# Patient Record
Sex: FEMALE | Race: BLACK OR AFRICAN AMERICAN
[De-identification: names, ages, dates, MRNs, and addresses within clinical notes are randomized per-mention and may not be internally consistent; named-entity substitution may affect disease eponyms.]

---

## 2018-08-25 ENCOUNTER — HOSPITAL ENCOUNTER (EMERGENCY)
Dept: HOSPITAL 72 - EMR | Age: 23
Discharge: HOME | End: 2018-08-25
Payer: COMMERCIAL

## 2018-08-25 VITALS — WEIGHT: 115 LBS | HEIGHT: 65 IN | BODY MASS INDEX: 19.16 KG/M2

## 2018-08-25 DIAGNOSIS — T38.3X1A: Primary | ICD-10-CM
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
## 2018-08-25 DIAGNOSIS — Z88.2: ICD-10-CM

## 2018-08-25 DIAGNOSIS — H10.213: ICD-10-CM
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
## 2018-08-25 PROCEDURE — 99282 EMERGENCY DEPT VISIT SF MDM: CPT

## 2018-08-26 VITALS — SYSTOLIC BLOOD PRESSURE: 114 MMHG | DIASTOLIC BLOOD PRESSURE: 82 MMHG

## 2019-03-05 NOTE — EMERGENCY ROOM REPORT
History of Present Illness


General


Chief Complaint:  Eye Problems


Source:  Patient





Present Illness


HPI


Is a 22-year-old female with no past medical history.  She presents with chief 

complaint of burning to the eyes.  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  

She washed it out.  Couple hours later start some burning sensation.  Accu-Chek 

there was normal.  She came in to be evaluated.  No blurry vision no other 

complaint.  Nothing made it better.  Nothing made it worse.


Allergies:  


Coded Allergies:  


     SULFA (SULFONAMIDE ANTIBIOTICS) (Verified  Allergy, Unknown, 8/25/18)





Patient History


Past Medical History:  see triage record, old chart reviewed


Past Surgical History:  none


Pertinent Family History:  none


Social History:  Denies: smoking


Last Menstrual Period:  MAY 2018


Pregnant Now:  No


Immunizations:  other


Reviewed Nursing Documentation:  PMH: Agreed; PSxH: Agreed





Nursing Documentation-PMH


Past Medical History:  No Stated History





Review of Systems


Eye:  Reports: eye pain; Denies: blurred vision


ENT:  Denies: ear pain, nose congestion, throat swelling


Respiratory:  Denies: cough, shortness of breath


Cardiovascular:  Denies: chest pain, palpitations


Gastrointestinal:  Denies: abdominal pain, diarrhea, nausea, vomiting


Musculoskeletal:  Denies: back pain, joint pain


Skin:  Denies: rash


Neurological:  Denies: headache, numbness


Endocrine:  Denies: increased thirst, increased urine


Hematologic/Lymphatic:  Denies: easy bruising


All Other Systems:  negative except mentioned in HPI





Physical Exam





Vital Signs








  Date Time  Temp Pulse Resp B/P (MAP) Pulse Ox O2 Delivery O2 Flow Rate FiO2


 


8/25/18 22:19 98.3 69 18 114/82 99 Room Air  





 98.2       





vitals normal


Sp02 EP Interpretation:  reviewed, normal


General Appearance:  well appearing, no apparent distress, alert


Head:  normocephalic, atraumatic


Eyes:  bilateral eye PERRL, bilateral eye EOMI


ENT:  hearing grossly normal, normal pharynx


Neck:  full range of motion, supple, no meningismus


Respiratory:  chest non-tender, lungs clear, normal breath sounds


Cardiovascular #1:  regular rate, rhythm, no murmur


Gastrointestinal:  normal bowel sounds, non tender, no mass, no organomegaly, 

no bruit, non-distended


Musculoskeletal:  back normal, gait/station normal, normal range of motion


Psychiatric:  mood/affect normal


Skin:  warm/dry





Medical Decision Making


Diagnostic Impression:  


 Primary Impression:  


 Chemical conjunctivitis of both eyes


ER Course


Patient with chemical conjunctivitis.  No visual problem.  No discharge.  No 

abrasion.  We'll discharge home with reassurance.





Last Vital Signs








  Date Time  Temp Pulse Resp B/P (MAP) Pulse Ox O2 Delivery O2 Flow Rate FiO2


 


8/25/18 22:19 98.3 69 18 114/82 99 Room Air  





 98.2       








Status:  unchanged


Disposition:  HOME, SELF-CARE


Condition:  Stable


Patient Instructions:  Chemical Conjunctivitis





Additional Instructions:  


Follow-up with employee health in 7 days as needed.  Return if worse.











CADY HANCOCK M.D. Aug 25, 2018 23:04
Statement Selected